# Patient Record
(demographics unavailable — no encounter records)

---

## 2024-10-11 NOTE — HISTORY OF PRESENT ILLNESS
[FreeTextEntry1] : Suzan Kim returns stating that unfortunately since she was here, her mother passed away and so she had to stop wearing the Cam walker.  She has been going up to her apartment and up and down stairs, and she states that she had too much to deal with.  She states that the foot feels pretty good in the fracture site, but states the other side of the foot is bothering her.  She states that she is doing okay.  She has been taking the anti-inflammatory.

## 2024-10-11 NOTE — ASSESSMENT
[FreeTextEntry1] : Assessment: Fifth metatarsal fracture of the left foot.  Plan:   DP and medial oblique weightbearing radiographs were taken of the left foot, which showed good apparent healing of the left fifth metatarsal head.  After further assessment the left foot was placed in approximately 90 degrees to the ankle with the foot in as close to neutral position as possible.  The foot and ankle were prepped to be clean and dry.  Pre tape spray may have been used on the foot and ankle as indicated.  Pre-tape adhesive spray provides an extra sticky, waterproof barrier, that helps tapes and strapping stick better to the skin. Perfect for oily skin, sweaty skin, water sports and extreme environments. It can be a game-changer when strapping challenging areas such as sweaty or wet ankles & feet. I then applied a strapping to the left foot and ankle for support and compression to help relieve pressure and symptoms related to the patient's foot/ankle problem.   The strapping can aid recovery by supporting the muscles, tendons, or ligaments around the affected painful area, as well as improving blood flow to the area. Plus, the strapping will reduce pain and improve the gait, preventing further injury from poor alignment. The strapping also provides proprioceptive feedback. The strapping can reduce the amount of stretching and moving the ligaments do when the patient is weight bearing. This not only gives tissues a better chance to heal, but it also helps prevent further damage. This strapping also minimizes foot pronation, collapse, or flattening which causes over use and irritation to the muscle, tendons, and ligaments in the foot. Often, relief of pain with the strapping is a diagnostic indication for the need of functional orthotic devices. In general, strappings may be used to treat strains, sprains, dislocations, and some fractures. Strapping the foot and ankle provides the external stabilization that stretched ligaments (tissues connecting bone to bone) need while they heal. Most studies show that strapping the foot and ankle decreases the incidence and severity of the affected area.  Besides physical support, strapping can increase biofeedback and increase proprioception, which is a body's ability to know where it is thus aiding in healing and reducing further exacerbation of the pain. The patient was instructed to leave the strapping in place for the next few days to a week, if it was comfortable.  The patient was advised to keep the strapping dry, but leave it on and dry with a hair dryer if it got wet.   The patient was further instructed that if the strapping was uncomfortable or causes any problems it should be removed right away and the office notified.  I advised her to limit her weightbearing and walking activities as best she can and to continue on the anti-inflammatory.  She will return in two weeks.

## 2024-10-11 NOTE — PHYSICAL EXAM
[TextEntry] : The left fifth MPJ is swollen, but pain upon palpation is minimal to absent.  Ecchymosis is absent.

## 2024-10-25 NOTE — ASSESSMENT
[FreeTextEntry1] : Assessment: Fifth metatarsal fracture, left foot.  Plan: I advised Suzan to continue activities as tolerated and shoe gear as tolerated.  I advised that if pain returns, she is to return for a followup x-ray.  If not, she will return as needed.

## 2024-10-25 NOTE — HISTORY OF PRESENT ILLNESS
[FreeTextEntry1] : Suzan Kim returns stating that she is doing much better.  She states that she has had little to no pain.  She is ambulating in a regular sneaker without the Cam walker and states that she has had little difficulty.